# Patient Record
Sex: FEMALE | Race: WHITE | Employment: FULL TIME | URBAN - METROPOLITAN AREA
[De-identification: names, ages, dates, MRNs, and addresses within clinical notes are randomized per-mention and may not be internally consistent; named-entity substitution may affect disease eponyms.]

---

## 2017-01-26 ENCOUNTER — OFFICE VISIT (OUTPATIENT)
Dept: CARDIOLOGY CLINIC | Age: 57
End: 2017-01-26

## 2017-01-26 VITALS
HEIGHT: 61 IN | OXYGEN SATURATION: 97 % | SYSTOLIC BLOOD PRESSURE: 118 MMHG | DIASTOLIC BLOOD PRESSURE: 70 MMHG | HEART RATE: 64 BPM | WEIGHT: 163 LBS | BODY MASS INDEX: 30.78 KG/M2

## 2017-01-26 DIAGNOSIS — I11.9 BENIGN HYPERTENSIVE HEART DISEASE WITHOUT HEART FAILURE: ICD-10-CM

## 2017-01-26 DIAGNOSIS — E78.5 DYSLIPIDEMIA: ICD-10-CM

## 2017-01-26 DIAGNOSIS — I49.3 SYMPTOMATIC PVCS: Primary | ICD-10-CM

## 2017-01-26 DIAGNOSIS — I73.9 PVD (PERIPHERAL VASCULAR DISEASE) (HCC): ICD-10-CM

## 2017-01-26 NOTE — PROGRESS NOTES
HISTORY OF PRESENT ILLNESS  Daryn Harris is a 64 y.o. female. Hypertension   Associated symptoms include shortness of breath. Pertinent negatives include no chest pain, no abdominal pain and no headaches. Palpitations    Associated symptoms include shortness of breath. Pertinent negatives include no fever, no chest pain, no claudication, no orthopnea, no PND, no abdominal pain, no nausea, no vomiting, no headaches, no dizziness and no cough. Her past medical history is significant for hypertension. Shortness of Breath   Pertinent negatives include no fever, no headaches, no cough, no wheezing, no PND, no orthopnea, no chest pain, no vomiting, no abdominal pain, no rash, no leg swelling and no claudication. Patient presents for a followup office visit. She has a past medical history significant for hypertension, dyslipidemia, peripheral vascular disease with a history of a left subclavian arterial stenosis status post a left carotid to subclavian bypass surgery in December 2011. She underwent a Holter study in December 2014 to evaluate palpitations which showed frequent and symptomatic premature ventricular contractions, occasionally and bigeminy and trigeminy. The PVCs represented 10% of all beats analyzed. Patient was last seen in the office 8 months ago. Since last visit, continues to have intermittent palpitations. She feels they are a little more frequent over the past 2-3 weeks, but still only lasting seconds at a time. They have not particularly changed in character. No associated dizziness, diaphoresis or syncope. She also continues to have exertional dyspnea which has not changed over the past 6-12 months. She denies any chest pain or pressure. No orthopnea, no PND, no leg swelling.     Past Medical History   Diagnosis Date    Active tobacco     Anxiety     Cardiac Holter exam, abnormal 12/15/2014     Predominant normal sinus rhythm, avg HR 72 bpm.  Symptomatic PVCs (10% of all beats) occasionally in bigeminy, trigeminy, pairs, & triplets.  Cardiac nuclear imaging test 04/11/2014     No ischemia. EF 69%. Nondiagnostic EKG on max EST due to resting ST abnormalities. Ex time 7 min 20 sec. Compared to study of 11/14/11, no significant change.  Cardiac transesophageal echocardiography (SAEED) 11/14/2011     Normal systolic function. No RWMA. No thrombus noted. No shunt identified. Mod to severe descending aortic atheroma. No evidence for dissection or aneurysm.  Cardiovascular upper extremity arterial duplex 09/20/2011     Patent left subclav artery w/monophasic signal c/w prox disease. May indicate signfiicant stenosis or occlusion not visualized. Compared to prev study on 6/11, there is progression of left subclavian artery disease.  Carotid duplex 10/06/2011     <50% ICA stenosis bilaterally. Left subclavian steal syndrome present.  Dyslipidemia     GERD     Hypertension     Hypothyroidism     Noncompliance     Peripheral vascular disease (HCC)     Subclavian steal syndrome     Subclavian vein occlusion, left (HCC)      7.0 x 27mm baloon expandable stent 2/11, 7.0 x 27mm EXPRESS LD 10/11    TIAs       Current Outpatient Prescriptions   Medication Sig Dispense Refill    cetirizine (ZYRTEC) 10 mg tablet Take  by mouth.  diazepam (VALIUM) 5 mg tablet Take 5 mg by mouth every six (6) hours as needed (vertigo).  BISMUTH SUBSALICYLATE (PEPTO-BISMOL PO) Take  by mouth.  lisinopril (PRINIVIL, ZESTRIL) 10 mg tablet Take 1 tablet by mouth daily. 30 tablet 5    atorvastatin (LIPITOR) 40 mg tablet Take  by mouth daily.  omeprazole (PRILOSEC) 40 mg capsule Take 40 mg by mouth daily.  metoprolol-XL (TOPROL XL) 50 mg XL tablet Take  by mouth daily.  clopidogrel (PLAVIX) 75 mg tablet Take  by mouth daily.  levothyroxine (SYNTHROID) 112 mcg tablet Take  by mouth Daily (before breakfast).       aspirin 81 mg tablet Take 81 mg by mouth daily.      SIMETHICONE (GAS-X PO) Take  by mouth as needed. Allergies   Allergen Reactions    Oxycodone Anxiety      Social History   Substance Use Topics    Smoking status: Former Smoker     Packs/day: 0.25     Years: 3.00     Quit date: 10/3/2011    Smokeless tobacco: Never Used    Alcohol use No         Review of Systems   Constitutional: Negative for chills, fever and weight loss. HENT: Negative for nosebleeds. Eyes: Negative for blurred vision and double vision. Respiratory: Positive for shortness of breath. Negative for cough and wheezing. Cardiovascular: Positive for palpitations. Negative for chest pain, orthopnea, claudication, leg swelling and PND. Gastrointestinal: Negative for abdominal pain, heartburn, nausea and vomiting. Genitourinary: Negative for dysuria and hematuria. Musculoskeletal: Negative for falls and myalgias. Skin: Negative for rash. Neurological: Negative for dizziness, focal weakness and headaches. Endo/Heme/Allergies: Does not bruise/bleed easily. Psychiatric/Behavioral: Negative for substance abuse. Visit Vitals    /70    Pulse 64    Ht 5' 1\" (1.549 m)    Wt 73.9 kg (163 lb)    SpO2 97%    BMI 30.8 kg/m2      Physical Exam   Constitutional: She is oriented to person, place, and time. She appears well-developed and well-nourished. HENT:   Head: Normocephalic and atraumatic. Eyes: Conjunctivae are normal.   Neck: Neck supple. No JVD present. Carotid bruit is not present. Cardiovascular: Normal rate, regular rhythm, S1 normal, S2 normal and normal pulses. Exam reveals no gallop. No murmur heard. Pulmonary/Chest: Effort normal and breath sounds normal. She has no wheezes. She has no rales. Abdominal: Soft. Bowel sounds are normal. There is no tenderness. Musculoskeletal: She exhibits no edema. Neurological: She is alert and oriented to person, place, and time. Skin: Skin is warm and dry.      EKG: Normal sinus rhythm normal axis, normal QTc interval, occasional PVC, no ST or T wave abnormalities concerning for ischemia. Compared to the previous EKG, no significant interval change    ASSESSMENT and PLAN    Symptomatic PVCs. These were frequent on a prior Holter monitor. The majority were isolated. Patient did have occasional episodes of bigeminy and trigeminy without significant runs of nonsustained ventricular tachycardia. She remains on a beta blocker, which I would continue. I suspect her palpitations that she is reporting are still related to the isolated PVCs and possibly either bigeminy or trigeminy. Hypertension. Well-controlled on her current regimen of lisinopril and metoprolol XL. Both of which I would continue. Dyslipidemia. Patient is on Atorvastatin 40 mg daily. Patient had a lipid panel drawn in August 2016 which was excellent. I will continue this regimen. This is followed by her PCP. Peripheral vascular disease. Patient initially had stenting of her left subclavian which then had to be bypassed with a left carotid to left subclavian bypass. This is widely patent and a CT scan in September 2012. She now follows up with vascular surgery, Dr. Mark Gutiérrez in [place].     Followup in 6 months, sooner if needed

## 2017-01-26 NOTE — MR AVS SNAPSHOT
Visit Information Date & Time Provider Department Dept. Phone Encounter #  
 1/26/2017  9:20 AM Sara Morgan MD [facility] [phone] Upcoming Health Maintenance Date Due Hepatitis C Screening 1960 DTaP/Tdap/Td series (1 - Tdap) 5/8/1981 PAP AKA CERVICAL CYTOLOGY 5/8/1981 BREAST CANCER SCRN MAMMOGRAM 5/8/2010 FOBT Q 1 YEAR AGE 50-75 5/8/2010 INFLUENZA AGE 9 TO ADULT 8/1/2016 Allergies as of 1/26/2017  Review Complete On: 5/25/2016 By: Sara Morgan MD  
  
 Severity Noted Reaction Type Reactions Oxycodone  11/04/2011    Anxiety Current Immunizations  Never Reviewed No immunizations on file. Not reviewed this visit You Were Diagnosed With   
  
 Codes Comments Symptomatic PVCs    -  Primary ICD-10-CM: I49.3 ICD-9-CM: 427.69 Benign hypertensive heart disease without heart failure     ICD-10-CM: I11.9 ICD-9-CM: 685. 10 PVD (peripheral vascular disease) ([facility]     ICD-10-CM: I73.9 ICD-9-CM: 443.9 Dizziness     ICD-10-CM: D17 ICD-9-CM: 780. 4 Chest pain, unspecified     ICD-10-CM: R07.9 ICD-9-CM: 786.50 Vitals BP Pulse Height(growth percentile) Weight(growth percentile) SpO2 BMI  
 118/70 64 5' 1\" (1.549 m) 163 lb (73.9 kg) 97% 30.8 kg/m2 Smoking Status Former Smoker Vitals History BMI and BSA Data Body Mass Index Body Surface Area  
 30.8 kg/m 2 1.78 m 2 Preferred Pharmacy Pharmacy Name Phone [facility] [address] [phone] Your Updated Medication List  
  
   
This list is accurate as of: 1/26/17  9:51 AM.  Always use your most recent med list.  
  
  
  
  
 aspirin 81 mg tablet Take 81 mg by mouth daily. cetirizine 10 mg tablet Commonly known as:  ZYRTEC Take  by mouth. diazePAM 5 mg tablet Commonly known as:  VALIUM  
 Take 5 mg by mouth every six (6) hours as needed (vertigo). GAS-X PO Take  by mouth as needed. LIPITOR 40 mg tablet Generic drug:  atorvastatin Take  by mouth daily. lisinopril 10 mg tablet Commonly known as:  Aundra Moao Take 1 tablet by mouth daily. PEPTO-BISMOL PO Take  by mouth. PLAVIX 75 mg Tab Generic drug:  clopidogrel Take  by mouth daily. PriLOSEC 40 mg capsule Generic drug:  omeprazole Take 40 mg by mouth daily. SYNTHROID 112 mcg tablet Generic drug:  levothyroxine Take  by mouth Daily (before breakfast). TOPROL XL 50 mg XL tablet Generic drug:  metoprolol succinate Take  by mouth daily. We Performed the Following AMB POC EKG ROUTINE W/ 12 LEADS, INTER & REP [06299 CPT(R)] Introducing [facility] & [facility]! Dear Dat Umaña: Thank you for requesting a Nichewith account. Our records indicate that you have previously registered for a Nichewith account but its currently inactive. Please call our Nichewith support line at [phone]. Additional Information If you have questions, please visit the Frequently Asked Questions section of the Nichewith website at [email or web address] Remember, [employer]hewith is NOT to be used for urgent needs. For medical emergencies, dial 911. Now available from your iPhone and Android! Please provide this summary of care documentation to your next provider. Your primary care clinician is listed as [address]. If you have any questions after today's visit, please call [phone].

## 2017-01-26 NOTE — PROGRESS NOTES
1. Have you been to the ER, urgent care clinic since your last visit? Hospitalized since your last visit? No     2. Have you seen or consulted any other health care providers outside of the [address] [place] [address] since your last visit? Include any pap smears or colon screening.  No

## 2017-06-28 ENCOUNTER — OFFICE VISIT (OUTPATIENT)
Dept: CARDIOLOGY CLINIC | Age: 57
End: 2017-06-28

## 2017-06-28 VITALS — HEIGHT: 61 IN

## 2017-06-28 DIAGNOSIS — E78.5 DYSLIPIDEMIA: ICD-10-CM

## 2017-06-28 DIAGNOSIS — Z01.818 PREOPERATIVE CLEARANCE: ICD-10-CM

## 2017-06-28 DIAGNOSIS — I49.3 SYMPTOMATIC PVCS: Primary | ICD-10-CM

## 2017-06-28 DIAGNOSIS — I11.9 BENIGN HYPERTENSIVE HEART DISEASE WITHOUT HEART FAILURE: ICD-10-CM

## 2017-06-28 DIAGNOSIS — I73.9 PVD (PERIPHERAL VASCULAR DISEASE) (HCC): ICD-10-CM

## 2017-06-28 RX ORDER — LISINOPRIL 10 MG/1
10 TABLET ORAL DAILY
Qty: 30 TAB | Refills: 5 | Status: SHIPPED | OUTPATIENT
Start: 2017-06-28 | End: 2018-03-12 | Stop reason: SDUPTHER

## 2017-06-28 RX ORDER — ATORVASTATIN CALCIUM 40 MG/1
40 TABLET, FILM COATED ORAL DAILY
Qty: 90 TAB | Refills: 3 | Status: SHIPPED | OUTPATIENT
Start: 2017-06-28 | End: 2018-07-21 | Stop reason: SDUPTHER

## 2017-06-28 RX ORDER — METOPROLOL SUCCINATE 50 MG/1
50 TABLET, EXTENDED RELEASE ORAL DAILY
Qty: 90 TAB | Refills: 3 | Status: SHIPPED | OUTPATIENT
Start: 2017-06-28 | End: 2018-09-22 | Stop reason: SDUPTHER

## 2017-06-28 NOTE — MR AVS SNAPSHOT
Visit Information Date & Time Provider Department Dept. Phone Encounter #  
 6/28/2017  8:20 AM Ese Astudillo MD [facility] [address] [id number] Upcoming Health Maintenance Date Due Hepatitis C Screening 1960 DTaP/Tdap/Td series (1 - Tdap) 5/8/1981 PAP AKA CERVICAL CYTOLOGY 5/8/1981 BREAST CANCER SCRN MAMMOGRAM 5/8/2010 FOBT Q 1 YEAR AGE 50-75 5/8/2010 INFLUENZA AGE 9 TO ADULT 8/1/2017 Allergies as of 6/28/2017  Review Complete On: 1/26/2017 By: Ese Astudillo MD  
  
 Severity Noted Reaction Type Reactions Oxycodone  11/04/2011    Anxiety Current Immunizations  Never Reviewed No immunizations on file. Not reviewed this visit You Were Diagnosed With   
  
 Codes Comments Preoperative clearance    -  Primary ICD-10-CM: Z41.537 ICD-9-CM: V72.84 Symptomatic PVCs     ICD-10-CM: I49.3 ICD-9-CM: 427.69 Benign hypertensive heart disease without heart failure     ICD-10-CM: I11.9 ICD-9-CM: 402.10 Dyslipidemia     ICD-10-CM: E78.5 ICD-9-CM: 272.4 PVD (peripheral vascular disease) ([facility]     ICD-10-CM: I73.9 ICD-9-CM: 443.9 Dizziness     ICD-10-CM: R72 ICD-9-CM: 780. 4 Chest pain, unspecified     ICD-10-CM: R07.9 ICD-9-CM: 786.50 Vitals Height(growth percentile) Smoking Status 5' 1\" (1.549 m) Former Smoker Preferred Pharmacy Pharmacy Name Phone [facility], [address] [place] [address] [phone] Your Updated Medication List  
  
   
This list is accurate as of: 6/28/17  8:40 AM.  Always use your most recent med list.  
  
  
  
  
 aspirin 81 mg tablet Take 81 mg by mouth daily. cetirizine 10 mg tablet Commonly known as:  ZYRTEC Take  by mouth. diazePAM 5 mg tablet Commonly known as:  VALIUM Take 5 mg by mouth every six (6) hours as needed (vertigo).   
  
 GAS-X PO  
 Take  by mouth as needed. LIPITOR 40 mg tablet Generic drug:  atorvastatin Take  by mouth daily. lisinopril 10 mg tablet Commonly known as:  Joni Lofty Take 1 tablet by mouth daily. PEPTO-BISMOL PO Take  by mouth. PLAVIX 75 mg Tab Generic drug:  clopidogrel Take  by mouth daily. PriLOSEC 40 mg capsule Generic drug:  omeprazole Take 40 mg by mouth daily. SYNTHROID 112 mcg tablet Generic drug:  levothyroxine Take  by mouth Daily (before breakfast). TOPROL XL 50 mg XL tablet Generic drug:  metoprolol succinate Take  by mouth daily. We Performed the Following AMB POC EKG ROUTINE W/ 12 LEADS, INTER & REP [89456 CPT(R)] Introducing [facility] & [facility]! [employer] introduces Zerto patient portal. Now you can access parts of your medical record, email your doctor's office, and request medication refills online. 1. In your internet browser, go to [email or web address] 2. Click on the First Time User? Click Here link in the Sign In box. You will see the New Member Sign Up page. 3. Enter your Zerto Access Code exactly as it appears below. You will not need to use this code after youve completed the sign-up process. If you do not sign up before the expiration date, you must request a new code. · Zerto Access Code: [email or web address] Expires: 9/26/2017  7:49 AM 
 
4. Enter the last four digits of your Social Security Number (xxxx) and Date of Birth (mm/dd/yyyy) as indicated and click Submit. You will be taken to the next sign-up page. 5. Create a Zerto ID. This will be your Zerto login ID and cannot be changed, so think of one that is secure and easy to remember. 6. Create a Zerto password. You can change your password at any time. 7. Enter your Password Reset Question and Answer. This can be used at a later time if you forget your password. 8. Enter your e-mail address. You will receive e-mail notification when new information is available in [address] 9. Click Sign Up. You can now view and download portions of your medical record. 10. Click the Download Summary menu link to download a portable copy of your medical information. If you have questions, please visit the Frequently Asked Questions section of the MedServe website. Remember, MedServe is NOT to be used for urgent needs. For medical emergencies, dial 911. Now available from your iPhone and Android! Please provide this summary of care documentation to your next provider. Your primary care clinician is listed as [address]. If you have any questions after today's visit, please call [phone].

## 2017-06-28 NOTE — LETTER
6/28/2017 8:41 AM 
 
Ms. Long Delacruz [address] [facility] [address] Long Delacruz was seen in our office on June 28, 2017 for cardiac evaluation. From a cardiac standpoint she is low risk for her ankle surgery with Dr. Ashlee Pardo, but should remain on her Lipitor and Toprol. Please feel free to contact our office if you have any questions regarding this patient. Sincerely, Cesar Gregg MD

## 2017-06-28 NOTE — PROGRESS NOTES
1. Have you been to the ER, urgent care clinic since your last visit? Hospitalized since your last visit? No     2. Have you seen or consulted any other health care providers outside of the [address] [place] [address] since your last visit? Include any pap smears or colon screening.  No

## 2017-06-28 NOTE — PROGRESS NOTES
HISTORY OF PRESENT ILLNESS  Alvin Bolivar is a 62 y.o. female. Palpitations    Associated symptoms include chest pain ( Atypical). Pertinent negatives include no fever, no claudication, no orthopnea, no PND, no abdominal pain, no nausea, no vomiting, no headaches, no dizziness, no cough and no shortness of breath. Her past medical history is significant for hypertension. Hypertension   Associated symptoms include chest pain ( Atypical). Pertinent negatives include no abdominal pain, no headaches and no shortness of breath. Patient presents for an add-on office visit. She has a past medical history significant for hypertension, dyslipidemia, peripheral vascular disease with a history of a left subclavian arterial stenosis status post a left carotid to subclavian bypass surgery in December 2011. She underwent a Holter study in December 2014 to evaluate palpitations which showed frequent and symptomatic premature ventricular contractions, occasionally and bigeminy and trigeminy. The PVCs represented 10% of all beats analyzed. Patient was last seen in the office 5 months ago. She returns for preoperative cardiac evaluation. Since last visit, continues to have intermittent palpitations. Palpitations have been a little bit more frequent over the past 3 weeks. She states she notices her heart skipping beats several times a week, lasting for a few minutes. She states that she will often take a Valium which alleviates the palpitations. She describes an episode of chest pain which occurred while weeding in her yard a few weeks ago, but that was only a single time and is not reoccurred. She denies any exertional chest pain or exertional dyspnea. No major change in her activity tolerance. She is scheduled to undergo right ankle surgery next week.     Past Medical History:   Diagnosis Date    Active tobacco     Anxiety     Cardiac Holter exam, abnormal 12/15/2014    Predominant normal sinus rhythm, avg HR 72 bpm.  Symptomatic PVCs (10% of all beats) occasionally in bigeminy, trigeminy, pairs, & triplets.  Cardiac nuclear imaging test 04/11/2014    No ischemia. EF 69%. Nondiagnostic EKG on max EST due to resting ST abnormalities. Ex time 7 min 20 sec. Compared to study of 11/14/11, no significant change.  Cardiac transesophageal echocardiography (SAEED) 35/26/6386    Normal systolic function. No RWMA. No thrombus noted. No shunt identified. Mod to severe descending aortic atheroma. No evidence for dissection or aneurysm.  Cardiovascular upper extremity arterial duplex 09/20/2011    Patent left subclav artery w/monophasic signal c/w prox disease. May indicate signfiicant stenosis or occlusion not visualized. Compared to prev study on 6/11, there is progression of left subclavian artery disease.  Carotid duplex 10/06/2011    <50% ICA stenosis bilaterally. Left subclavian steal syndrome present.  Dyslipidemia     GERD     Hypertension     Hypothyroidism     Noncompliance     Peripheral vascular disease (HCC)     Subclavian steal syndrome     Subclavian vein occlusion, left (HCC)     7.0 x 27mm baloon expandable stent 2/11, 7.0 x 27mm EXPRESS LD 10/11    TIAs       Current Outpatient Prescriptions   Medication Sig Dispense Refill    cetirizine (ZYRTEC) 10 mg tablet Take  by mouth.  diazepam (VALIUM) 5 mg tablet Take 5 mg by mouth every six (6) hours as needed (vertigo).  BISMUTH SUBSALICYLATE (PEPTO-BISMOL PO) Take  by mouth.  lisinopril (PRINIVIL, ZESTRIL) 10 mg tablet Take 1 tablet by mouth daily. 30 tablet 5    atorvastatin (LIPITOR) 40 mg tablet Take  by mouth daily.  omeprazole (PRILOSEC) 40 mg capsule Take 40 mg by mouth daily.  metoprolol-XL (TOPROL XL) 50 mg XL tablet Take  by mouth daily.  clopidogrel (PLAVIX) 75 mg tablet Take  by mouth daily.  levothyroxine (SYNTHROID) 112 mcg tablet Take  by mouth Daily (before breakfast).       aspirin 81 mg tablet Take 81 mg by mouth daily.  SIMETHICONE (GAS-X PO) Take  by mouth as needed. Allergies   Allergen Reactions    Oxycodone Anxiety      Social History   Substance Use Topics    Smoking status: Former Smoker     Packs/day: 0.25     Years: 3.00     Quit date: 10/3/2011    Smokeless tobacco: Never Used    Alcohol use No         Review of Systems   Constitutional: Negative for chills, fever and weight loss. HENT: Negative for nosebleeds. Eyes: Negative for blurred vision and double vision. Respiratory: Negative for cough, shortness of breath and wheezing. Cardiovascular: Positive for chest pain ( Atypical) and palpitations. Negative for orthopnea, claudication, leg swelling and PND. Gastrointestinal: Negative for abdominal pain, heartburn, nausea and vomiting. Genitourinary: Negative for dysuria and hematuria. Musculoskeletal: Negative for falls and myalgias. Skin: Negative for rash. Neurological: Negative for dizziness, focal weakness and headaches. Endo/Heme/Allergies: Does not bruise/bleed easily. Psychiatric/Behavioral: Negative for substance abuse. Visit Vitals    Ht 5' 1\" (1.549 m)      Physical Exam   Constitutional: She is oriented to person, place, and time. She appears well-developed and well-nourished. HENT:   Head: Normocephalic and atraumatic. Eyes: Conjunctivae are normal.   Neck: Neck supple. No JVD present. Carotid bruit is not present. Cardiovascular: Regular rhythm, S1 normal, S2 normal and normal pulses. Bradycardia present. Exam reveals no gallop. No murmur heard. Pulmonary/Chest: Effort normal and breath sounds normal. She has no wheezes. She has no rales. Abdominal: Soft. Bowel sounds are normal. There is no tenderness. Musculoskeletal: She exhibits no edema. Neurological: She is alert and oriented to person, place, and time. Skin: Skin is warm and dry.      EKG: Sinus bradycardia, normal axis, normal QTc interval, poor R-wave progression, no ST-T wave abnormalities concerning for ischemia. Can prior to the previous EKG, PVCs are no longer present. ASSESSMENT and PLAN    Low risk from a cardiac standpoint to proceed with ankle surgery next week. Patient should remain on her beta-blocker and statin perioperatively. Symptomatic PVCs. These were frequent on a prior Holter monitor in the past.  The majority were isolated. Patient remains on a scheduled dosage of metoprolol XL. Her palpitations still occur intermittently. I would recommend continuing her metoprolol. No further workup needed at this time. Hypertension. Well-controlled on her current regimen of lisinopril and metoprolol XL. Both of which I would continue. Dyslipidemia. Patient is on Atorvastatin 40 mg daily. Historically this has been well controlled on this regimen. This is followed by her PCP. Peripheral vascular disease. Patient initially had stenting of her left subclavian which then had to be bypassed with a left carotid to left subclavian bypass. This is widely patent and a CT scan in September 2012. She now follows up with vascular surgery, Dr. Glen Wilson in [place].     Followup in 6 months, sooner if needed

## 2018-03-12 ENCOUNTER — OFFICE VISIT (OUTPATIENT)
Dept: CARDIOLOGY CLINIC | Age: 58
End: 2018-03-12

## 2018-03-12 VITALS
WEIGHT: 170 LBS | SYSTOLIC BLOOD PRESSURE: 150 MMHG | DIASTOLIC BLOOD PRESSURE: 100 MMHG | HEIGHT: 61 IN | OXYGEN SATURATION: 98 % | HEART RATE: 61 BPM | BODY MASS INDEX: 32.1 KG/M2

## 2018-03-12 DIAGNOSIS — I11.9 BENIGN HYPERTENSIVE HEART DISEASE WITHOUT HEART FAILURE: Primary | ICD-10-CM

## 2018-03-12 DIAGNOSIS — I73.9 PVD (PERIPHERAL VASCULAR DISEASE) (HCC): ICD-10-CM

## 2018-03-12 DIAGNOSIS — I49.3 SYMPTOMATIC PVCS: ICD-10-CM

## 2018-03-12 DIAGNOSIS — E78.5 DYSLIPIDEMIA: ICD-10-CM

## 2018-03-12 RX ORDER — LISINOPRIL 20 MG/1
20 TABLET ORAL DAILY
Qty: 30 TAB | Refills: 6 | Status: SHIPPED | OUTPATIENT
Start: 2018-03-12 | End: 2018-10-21 | Stop reason: SDUPTHER

## 2018-03-12 NOTE — PROGRESS NOTES
HISTORY OF PRESENT ILLNESS  Jabari López is a 62 y.o. female. Hypertension   Associated symptoms include shortness of breath. Pertinent negatives include no chest pain, no abdominal pain and no headaches. Shortness of Breath   Pertinent negatives include no fever, no headaches, no cough, no wheezing, no PND, no orthopnea, no chest pain, no vomiting, no abdominal pain, no rash, no leg swelling and no claudication. Palpitations    Associated symptoms include shortness of breath. Pertinent negatives include no fever, no chest pain, no claudication, no orthopnea, no PND, no abdominal pain, no nausea, no vomiting, no headaches, no dizziness and no cough. Her past medical history is significant for hypertension. Patient presents for an overdue follow-up office visit. She has a past medical history significant for hypertension, dyslipidemia, peripheral vascular disease with a history of a left subclavian arterial stenosis status post a left carotid to subclavian bypass surgery in December 2011. She underwent a Holter study in December 2014 to evaluate palpitations which showed frequent and symptomatic premature ventricular contractions, occasionally and bigeminy and trigeminy. The PVCs represented 10% of all beats analyzed. Patient was last seen in the office 8-9 months ago. Since last visit, she was diagnosed with a right lower extremity DVT below the knee and was treated with oral anticoagulation for 6 months which she recently discontinued. She reports that she had a follow-up venous duplex scan which showed resolution of her DVT. She has gained some weight since last visit. She also complains of intermittent heart palpitations for the past several months usually lasting for a few minutes in duration. They usually improve after she takes a Valium. She is also noted dyspnea on exertion which she does not feels markedly different from last visit. No chest pain or pressure.     Past Medical History: Diagnosis Date    Active tobacco     Anxiety     Cardiac Holter exam, abnormal 12/15/2014    Predominant normal sinus rhythm, avg HR 72 bpm.  Symptomatic PVCs (10% of all beats) occasionally in bigeminy, trigeminy, pairs, & triplets.  Cardiac nuclear imaging test 04/11/2014    No ischemia. EF 69%. Nondiagnostic EKG on max EST due to resting ST abnormalities. Ex time 7 min 20 sec. Compared to study of 11/14/11, no significant change.  Cardiac transesophageal echocardiography (SAEED) 26/93/8843    Normal systolic function. No RWMA. No thrombus noted. No shunt identified. Mod to severe descending aortic atheroma. No evidence for dissection or aneurysm.  Cardiovascular upper extremity arterial duplex 09/20/2011    Patent left subclav artery w/monophasic signal c/w prox disease. May indicate signfiicant stenosis or occlusion not visualized. Compared to prev study on 6/11, there is progression of left subclavian artery disease.  Carotid duplex 10/06/2011    <50% ICA stenosis bilaterally. Left subclavian steal syndrome present.  Cerebral artery occlusion with cerebral infarction ([address]     Dyslipidemia     GERD     Hematuria, microscopic     Hypertension     Hypertension     Hypothyroidism     Noncompliance     Peripheral vascular disease (HCC)     Subclavian steal syndrome     Subclavian vein occlusion, left (HCC)     7.0 x 27mm baloon expandable stent 2/11, 7.0 x 27mm EXPRESS LD 10/11    TIAs     UTI (urinary tract infection)       Current Outpatient Prescriptions   Medication Sig Dispense Refill    levothyroxine (SYNTHROID) 100 mcg tablet       lisinopril (PRINIVIL, ZESTRIL) 10 mg tablet Take 1 Tab by mouth daily. 30 Tab 5    atorvastatin (LIPITOR) 40 mg tablet Take 1 Tab by mouth daily. 90 Tab 3    metoprolol succinate (TOPROL XL) 50 mg XL tablet Take 1 Tab by mouth daily. 90 Tab 3    cetirizine (ZYRTEC) 10 mg tablet Take  by mouth.       diazepam (VALIUM) 5 mg tablet Take 5 mg by mouth every six (6) hours as needed (vertigo).  BISMUTH SUBSALICYLATE (PEPTO-BISMOL PO) Take  by mouth.  omeprazole (PRILOSEC) 40 mg capsule Take 40 mg by mouth daily.  clopidogrel (PLAVIX) 75 mg tablet Take  by mouth daily.  aspirin 81 mg tablet Take 81 mg by mouth daily.  SIMETHICONE (GAS-X PO) Take  by mouth as needed. Allergies   Allergen Reactions    Oxycodone Anxiety      Social History   Substance Use Topics    Smoking status: Former Smoker     Packs/day: 0.25     Years: 3.00     Quit date: 10/3/2011    Smokeless tobacco: Never Used    Alcohol use Yes      Comment: rarely         Review of Systems   Constitutional: Negative for chills, fever and weight loss. HENT: Negative for nosebleeds. Eyes: Negative for blurred vision and double vision. Respiratory: Positive for shortness of breath. Negative for cough and wheezing. Cardiovascular: Positive for palpitations. Negative for chest pain, orthopnea, claudication, leg swelling and PND. Gastrointestinal: Negative for abdominal pain, heartburn, nausea and vomiting. Genitourinary: Negative for dysuria and hematuria. Musculoskeletal: Negative for falls and myalgias. Skin: Negative for rash. Neurological: Negative for dizziness, focal weakness and headaches. Endo/Heme/Allergies: Does not bruise/bleed easily. Psychiatric/Behavioral: Negative for substance abuse. Visit Vitals    BP (!) 150/100    Pulse 61    Ht 5' 1\" (1.549 m)    Wt 77.1 kg (170 lb)    SpO2 98%    BMI 32.12 kg/m2      Physical Exam   Constitutional: She is oriented to person, place, and time. She appears well-developed and well-nourished. HENT:   Head: Normocephalic and atraumatic. Eyes: Conjunctivae are normal.   Neck: Neck supple. No JVD present. Carotid bruit is not present. Cardiovascular: Normal rate, regular rhythm, S1 normal, S2 normal and normal pulses. Exam reveals no gallop.     No murmur heard.  Pulmonary/Chest: Effort normal and breath sounds normal. She has no wheezes. She has no rales. Abdominal: Soft. Bowel sounds are normal. There is no tenderness. Musculoskeletal: She exhibits no edema. Neurological: She is alert and oriented to person, place, and time. Skin: Skin is warm and dry. EKG: Normal sinus rhythm, normal axis, normal QTc interval, poor R-wave progression, no ST-T wave abnormalities concerning for ischemia. Can prior to the previous EKG, no significant interval change. ASSESSMENT and PLAN    Essential hypertension. Patient reports that her blood pressure has been elevated more often than not. I recommended increasing lisinopril up to 20 mg daily and continue her current metoprolol dosage. Symptomatic PVCs. These were frequent on a prior Holter monitor in the past.  The majority were isolated. Patient remains on a scheduled dosage of metoprolol XL. She continues to have the heart palpitations intermittently. I would continue her current metoprolol dosage for now. Dyslipidemia. Patient is on Atorvastatin 40 mg daily. Historically this has been well controlled on this regimen. This is followed by her PCP. Peripheral vascular disease. Patient initially had stenting of her left subclavian which then had to be bypassed with a left carotid to left subclavian bypass. This is widely patent and a CT scan in September 2012. She now follows up with vascular surgery, Dr. Venkatesh Luevano in [place]. History of right lower extremity DVT. This was below the knee and treated with 6 months of oral anticoagulation. A follow-up venous duplex scan showed resolution of the DVT. I would like the patient to follow-up in 4 weeks for blood pressure check, and then see me back in 6 months, sooner if needed.

## 2018-03-12 NOTE — MR AVS SNAPSHOT
[address] [place] [id number] 
[phone] Patient: Jolly Tamayo MRN: [id number] SGX:4/0/2892 Visit Information Date & Time Provider Department Dept. Phone Encounter #  
 3/12/2018  9:40 AM Nimisha Munguia MD [facility] [id number] Follow-up Instructions Return in about 6 months (around 9/12/2018). Follow-up and Disposition History Upcoming Health Maintenance Date Due Hepatitis C Screening 1960 DTaP/Tdap/Td series (1 - Tdap) 5/8/1981 PAP AKA CERVICAL CYTOLOGY 5/8/1981 BREAST CANCER SCRN MAMMOGRAM 5/8/2010 FOBT Q 1 YEAR AGE 50-75 5/8/2010 Influenza Age 5 to Adult 8/1/2017 Allergies as of 3/12/2018  Review Complete On: 3/12/2018 By: Nimisha Munguia MD  
  
 Severity Noted Reaction Type Reactions Oxycodone  11/04/2011    Anxiety Current Immunizations  Never Reviewed No immunizations on file. Not reviewed this visit You Were Diagnosed With   
  
 Codes Comments Benign hypertensive heart disease without heart failure    -  Primary ICD-10-CM: I11.9 ICD-9-CM: 402.10 Symptomatic PVCs     ICD-10-CM: I49.3 ICD-9-CM: 427.69 Dyslipidemia     ICD-10-CM: E78.5 ICD-9-CM: 272.4 PVD (peripheral vascular disease) ([facility]     ICD-10-CM: I73.9 ICD-9-CM: 443. 9 Vitals BP Pulse Height(growth percentile) Weight(growth percentile) SpO2 BMI  
 (!) 150/100 61 5' 1\" (1.549 m) 170 lb (77.1 kg) 98% 32.12 kg/m2 Smoking Status Former Smoker Vitals History BMI and BSA Data Body Mass Index Body Surface Area  
 32.12 kg/m 2 1.82 m 2 Preferred Pharmacy Pharmacy Name Phone [facility] [address] [place] [address] [phone] Your Updated Medication List  
  
   
This list is accurate as of 3/12/18 10:18 AM.  Always use your most recent med list.  
  
  
  
  
 aspirin 81 mg tablet Take 81 mg by mouth daily. atorvastatin 40 mg tablet Commonly known as:  LIPITOR Take 1 Tab by mouth daily. cetirizine 10 mg tablet Commonly known as:  ZYRTEC Take  by mouth. diazePAM 5 mg tablet Commonly known as:  VALIUM Take 5 mg by mouth every six (6) hours as needed (vertigo). GAS-X PO Take  by mouth as needed. levothyroxine 100 mcg tablet Commonly known as:  SYNTHROID  
  
 lisinopril 20 mg tablet Commonly known as:  [place] Take 1 Tab by mouth daily. metoprolol succinate 50 mg XL tablet Commonly known as:  TOPROL XL Take 1 Tab by mouth daily. PEPTO-BISMOL PO Take  by mouth. PLAVIX 75 mg Tab Generic drug:  clopidogrel Take  by mouth daily. PriLOSEC 40 mg capsule Generic drug:  omeprazole Take 40 mg by mouth daily. Prescriptions Sent to Pharmacy Refills  
 lisinopril (PRINIVIL, ZESTRIL) 20 mg tablet 6 Sig: Take 1 Tab by mouth daily. Class: Normal  
 Pharmacy: [address] Ph #: [phone] Route: Oral  
  
We Performed the Following AMB POC EKG ROUTINE W/ 12 LEADS, INTER & REP [91514 CPT(R)] Follow-up Instructions Return in about 6 months (around 9/12/2018). Patient Instructions Increase lisinipril to 20 mg daily 
bp check in 4 weeks 6 month follow up Introducing [facility]! Dear Parker Carpenter: Thank you for requesting a [employer] account. Our records indicate that you already have an active [employer] account. You can access your account anytime at [email or web address] Did you know that you can access your hospital and ER discharge instructions at any time in [employer]? You can also review all of your test results from your hospital stay or ER visit. Additional Information If you have questions, please visit the Frequently Asked Questions section of the [email or web address] website at [email or web address] Remember, [employer] is NOT to be used for urgent needs. For medical emergencies, dial 911. Now available from your iPhone and Android! Please provide this summary of care documentation to your next provider. Your primary care clinician is listed as [phone] [phone]. If you have any questions after today's visit, please call [phone].

## 2018-04-13 ENCOUNTER — CLINICAL SUPPORT (OUTPATIENT)
Dept: CARDIOLOGY CLINIC | Age: 58
End: 2018-04-13

## 2018-04-13 VITALS
OXYGEN SATURATION: 97 % | SYSTOLIC BLOOD PRESSURE: 120 MMHG | HEART RATE: 58 BPM | BODY MASS INDEX: 32.1 KG/M2 | WEIGHT: 170 LBS | HEIGHT: 61 IN | DIASTOLIC BLOOD PRESSURE: 92 MMHG

## 2018-04-13 DIAGNOSIS — I10 HYPERTENSION, UNSPECIFIED TYPE: Primary | ICD-10-CM

## 2018-04-13 NOTE — PROGRESS NOTES
Oscar Coleman is a 62 y.o. female that is here for a blood pressure check after starting Lisinipril 20 mg daily ordered at last office visit. Her current medications are listed below. Current Outpatient Prescriptions   Medication Sig    lisinopril (PRINIVIL, ZESTRIL) 20 mg tablet Take 1 Tab by mouth daily.  atorvastatin (LIPITOR) 40 mg tablet Take 1 Tab by mouth daily.  metoprolol succinate (TOPROL XL) 50 mg XL tablet Take 1 Tab by mouth daily.  clopidogrel (PLAVIX) 75 mg tablet Take  by mouth daily.  aspirin 81 mg tablet Take 81 mg by mouth daily.  levothyroxine (SYNTHROID) 100 mcg tablet     cetirizine (ZYRTEC) 10 mg tablet Take  by mouth.  diazepam (VALIUM) 5 mg tablet Take 5 mg by mouth every six (6) hours as needed (vertigo).  BISMUTH SUBSALICYLATE (PEPTO-BISMOL PO) Take  by mouth.  omeprazole (PRILOSEC) 40 mg capsule Take 40 mg by mouth daily.  SIMETHICONE (GAS-X PO) Take  by mouth as needed. No current facility-administered medications for this visit. Her   Visit Vitals    BP (!) 120/92 (BP 1 Location: Left arm)    Pulse (!) 58    Ht 5' 1\" (1.549 m)    Wt 77.1 kg (170 lb)    SpO2 97%    BMI 32.12 kg/m2     Patient was seen today for a bp check after starting Lisinipril 20mg 1 daily at last office visit on 03/12/18. Patient denies having cardiac symptoms such as chest pain, dizziness, SOB. Patient states she does have feet swelling if standing on her feet for long periods of time during the day, but states she elevates her feet at night which gets rid of the swelling. Patient reports she continues to have palpitation which was discussed at her last office visit with Dr Gabbi Mo. She states she takes all of her morning meds around 5:30-6am each morning and denies any missed doses. Patient request her bp be taken in both arm with results of 124/88 right arm and 120/92 left arm. Patient agrees to continue to take medications as prescribed.  Patient has no concerns to be addressed at this time.

## 2018-07-23 RX ORDER — ATORVASTATIN CALCIUM 40 MG/1
TABLET, FILM COATED ORAL
Qty: 90 TAB | Refills: 3 | Status: SHIPPED | OUTPATIENT
Start: 2018-07-23

## 2018-09-24 RX ORDER — METOPROLOL SUCCINATE 50 MG/1
TABLET, EXTENDED RELEASE ORAL
Qty: 90 TAB | Refills: 3 | Status: SHIPPED | OUTPATIENT
Start: 2018-09-24 | End: 2019-09-25 | Stop reason: SDUPTHER

## 2018-10-22 RX ORDER — LISINOPRIL 20 MG/1
TABLET ORAL
Qty: 90 TAB | Refills: 3 | Status: ON HOLD | OUTPATIENT
Start: 2018-10-22 | End: 2021-09-10

## 2019-05-29 ENCOUNTER — TELEPHONE (OUTPATIENT)
Dept: CARDIOLOGY CLINIC | Age: 59
End: 2019-05-29

## 2019-05-29 NOTE — TELEPHONE ENCOUNTER
[facility] faxed over a request for medication clearance prior to allergy testing. Verbal order and read back per Jarvis Lieberman, DO  Can hold metoprolol 24 hours prior to testing. Clearance faxed back to ENT.

## 2019-07-12 PROBLEM — M54.12 CERVICAL RADICULOPATHY: Status: ACTIVE | Noted: 2019-07-12

## 2019-07-12 PROBLEM — K11.7 XEROSTOMIA: Status: ACTIVE | Noted: 2019-03-20

## 2019-07-12 PROBLEM — I10 ESSENTIAL HYPERTENSION: Status: ACTIVE | Noted: 2019-07-12

## 2019-07-12 PROBLEM — E03.9 HYPOTHYROIDISM: Status: ACTIVE | Noted: 2019-07-12

## 2019-07-12 PROBLEM — E78.5 HYPERLIPIDEMIA: Status: ACTIVE | Noted: 2019-07-12

## 2019-07-12 PROBLEM — M79.661 RIGHT CALF PAIN: Status: ACTIVE | Noted: 2018-01-26

## 2019-07-12 PROBLEM — J30.9 ALLERGIC RHINITIS: Status: ACTIVE | Noted: 2019-07-12

## 2019-07-12 PROBLEM — H69.80 DYSFUNCTION OF EUSTACHIAN TUBE: Status: ACTIVE | Noted: 2019-07-12

## 2019-07-12 PROBLEM — I82.4Z1 ACUTE VENOUS EMBOLISM AND THROMBOSIS OF DEEP VESSELS OF DISTAL END OF RIGHT LOWER EXTREMITY (HCC): Status: ACTIVE | Noted: 2017-09-13

## 2019-07-12 PROBLEM — I82.541 CHRONIC DEEP VEIN THROMBOSIS (DVT) OF TIBIAL VEIN OF RIGHT LOWER EXTREMITY (HCC): Status: ACTIVE | Noted: 2017-11-14

## 2019-07-12 PROBLEM — M54.2 NECK PAIN: Status: ACTIVE | Noted: 2019-07-12

## 2019-07-12 PROBLEM — N10 ACUTE PYELONEPHRITIS WITHOUT MEDULLARY NECROSIS: Status: ACTIVE | Noted: 2017-09-11

## 2019-07-12 PROBLEM — E55.9 VITAMIN D DEFICIENCY: Status: ACTIVE | Noted: 2019-07-12

## 2019-07-12 PROBLEM — M25.519 SHOULDER JOINT PAIN: Status: ACTIVE | Noted: 2019-07-12

## 2019-07-12 PROBLEM — R03.0 ELEVATED BLOOD-PRESSURE READING WITHOUT DIAGNOSIS OF HYPERTENSION: Status: ACTIVE | Noted: 2019-07-12

## 2019-07-12 PROBLEM — F41.1 ANXIETY STATE: Status: ACTIVE | Noted: 2019-07-12

## 2019-07-12 PROBLEM — Z86.73 HISTORY OF TRANSIENT ISCHEMIC ATTACK (TIA): Status: ACTIVE | Noted: 2019-02-18

## 2019-07-12 PROBLEM — M79.673 FOOT PAIN: Status: ACTIVE | Noted: 2019-07-12

## 2019-09-26 RX ORDER — METOPROLOL SUCCINATE 50 MG/1
TABLET, EXTENDED RELEASE ORAL
Qty: 30 TAB | Refills: 0 | Status: SHIPPED | OUTPATIENT
Start: 2019-09-26 | End: 2019-10-27 | Stop reason: SDUPTHER

## 2019-10-28 RX ORDER — METOPROLOL SUCCINATE 50 MG/1
TABLET, EXTENDED RELEASE ORAL
Qty: 90 TAB | Refills: 3 | Status: SHIPPED | OUTPATIENT
Start: 2019-10-28

## 2021-08-03 PROBLEM — E78.5 HYPERLIPIDEMIA: Status: RESOLVED | Noted: 2019-07-12 | Resolved: 2021-08-03
